# Patient Record
Sex: MALE | Race: OTHER | HISPANIC OR LATINO
[De-identification: names, ages, dates, MRNs, and addresses within clinical notes are randomized per-mention and may not be internally consistent; named-entity substitution may affect disease eponyms.]

---

## 2017-12-05 PROBLEM — Z00.00 ENCOUNTER FOR PREVENTIVE HEALTH EXAMINATION: Status: ACTIVE | Noted: 2017-12-05

## 2017-12-06 ENCOUNTER — APPOINTMENT (OUTPATIENT)
Dept: SPINE | Facility: CLINIC | Age: 37
End: 2017-12-06
Payer: COMMERCIAL

## 2017-12-06 VITALS
SYSTOLIC BLOOD PRESSURE: 127 MMHG | BODY MASS INDEX: 32.2 KG/M2 | HEART RATE: 78 BPM | HEIGHT: 71 IN | DIASTOLIC BLOOD PRESSURE: 78 MMHG | WEIGHT: 230 LBS | OXYGEN SATURATION: 99 %

## 2017-12-06 DIAGNOSIS — Z78.9 OTHER SPECIFIED HEALTH STATUS: ICD-10-CM

## 2017-12-06 DIAGNOSIS — M51.26 OTHER INTERVERTEBRAL DISC DISPLACEMENT, LUMBAR REGION: ICD-10-CM

## 2017-12-06 DIAGNOSIS — R42 DIZZINESS AND GIDDINESS: ICD-10-CM

## 2017-12-06 DIAGNOSIS — M54.16 RADICULOPATHY, LUMBAR REGION: ICD-10-CM

## 2017-12-06 PROCEDURE — 99204 OFFICE O/P NEW MOD 45 MIN: CPT

## 2017-12-07 PROBLEM — R42 LIGHTHEADEDNESS: Status: RESOLVED | Noted: 2017-12-06 | Resolved: 2017-12-07

## 2017-12-07 PROBLEM — M51.26 LUMBAR HERNIATED DISC: Status: ACTIVE | Noted: 2017-12-06

## 2017-12-07 PROBLEM — M54.16 LUMBAR RADICULOPATHY: Status: ACTIVE | Noted: 2017-12-06

## 2017-12-07 PROBLEM — Z78.9 NEVER SMOKED CIGARETTES: Status: ACTIVE | Noted: 2017-12-06

## 2017-12-07 PROBLEM — Z78.9 DOES NOT USE ILLICIT DRUGS: Status: ACTIVE | Noted: 2017-12-06

## 2019-03-26 ENCOUNTER — FORM ENCOUNTER (OUTPATIENT)
Age: 39
End: 2019-03-26

## 2019-03-27 ENCOUNTER — OUTPATIENT (OUTPATIENT)
Dept: OUTPATIENT SERVICES | Facility: HOSPITAL | Age: 39
LOS: 1 days | End: 2019-03-27
Payer: COMMERCIAL

## 2019-03-27 ENCOUNTER — APPOINTMENT (OUTPATIENT)
Dept: ORTHOPEDIC SURGERY | Facility: CLINIC | Age: 39
End: 2019-03-27
Payer: COMMERCIAL

## 2019-03-27 VITALS
BODY MASS INDEX: 32.2 KG/M2 | OXYGEN SATURATION: 98 % | HEIGHT: 71 IN | SYSTOLIC BLOOD PRESSURE: 115 MMHG | DIASTOLIC BLOOD PRESSURE: 80 MMHG | HEART RATE: 79 BPM | WEIGHT: 230 LBS

## 2019-03-27 DIAGNOSIS — M54.5 LOW BACK PAIN: ICD-10-CM

## 2019-03-27 DIAGNOSIS — Z78.9 OTHER SPECIFIED HEALTH STATUS: ICD-10-CM

## 2019-03-27 DIAGNOSIS — R20.2 PARESTHESIA OF SKIN: ICD-10-CM

## 2019-03-27 DIAGNOSIS — M51.36 OTHER INTERVERTEBRAL DISC DEGENERATION, LUMBAR REGION: ICD-10-CM

## 2019-03-27 DIAGNOSIS — G89.29 LOW BACK PAIN: ICD-10-CM

## 2019-03-27 DIAGNOSIS — M99.83 OTHER BIOMECHANICAL LESIONS OF LUMBAR REGION: ICD-10-CM

## 2019-03-27 PROCEDURE — 72082 X-RAY EXAM ENTIRE SPI 2/3 VW: CPT

## 2019-03-27 PROCEDURE — 99204 OFFICE O/P NEW MOD 45 MIN: CPT

## 2019-03-27 PROCEDURE — 72100 X-RAY EXAM L-S SPINE 2/3 VWS: CPT

## 2019-03-27 PROCEDURE — 72084 X-RAY EXAM ENTIRE SPI 6/> VW: CPT | Mod: 26

## 2019-03-27 RX ORDER — CYCLOBENZAPRINE HYDROCHLORIDE 10 MG/1
10 TABLET, FILM COATED ORAL
Refills: 0 | Status: ACTIVE | COMMUNITY

## 2019-03-27 RX ORDER — METHYLPREDNISOLONE 4 MG/1
4 TABLET ORAL
Qty: 1 | Refills: 0 | Status: DISCONTINUED | COMMUNITY
Start: 2017-12-07 | End: 2019-03-27

## 2019-03-27 RX ORDER — NAPROXEN 500 MG/1
500 TABLET ORAL
Refills: 0 | Status: ACTIVE | COMMUNITY

## 2019-03-27 NOTE — PHYSICAL EXAM
[de-identified] : General: patient is well developed, well nourished, in no acute \par distress, alert and oriented x 3. \par \par Mood and affect: normal\par \par Respiratory: no respiratory distress noted\par \par Skin: no scars over spine, skin intact, no erythema, increased warmth\par \par Alignment:The spine is well compensated in the coronal and sagittal plane.  There is no asymmetry on the carr forward bend test\par \par Gait: The patient is able to toe walk and heel walk without difficulty. The patient is able to tandem gait without difficulty.\par \par Palpation: no tenderness to palpation spine or paraspinal region\par \par Range of motion: Lumbar spine ROM is full\par \par Neurologic Exam:\par Motor: Manual Muscle testing in the lower extremities is 5 out of 5 in all muscle groups. There is no evidence of muscular atrophy in the lower extremities. Sensory: Sensation to light touch is grossly intact in the lower extremities\par \par Reflexes: DTR are present and symmetric throughout, negative barry bilaterally, negative inverted radial reflex bilaterally, no clonus, plantar responses are flexor\par \par Hip Exam: No pain with internal or external rotation of hips bilaterally\par \par Special tests: Straight leg raise test negative.  Cross straight leg test negative.  KYLAH test negative\par \par Vascular: Examination of the peripheral vascular system demonstrates no evidence of congestion or edema. no evidence of lymphedema bilateral lower extremities, pulses are present and symmetric in both lower extremities.\par \par  [de-identified] : XR thoracolumbar 3/27/19: Severe L5/S1 disc degeneration, height loss.  no instability/fractures.  no scoliosis.\par \par MRI lumbar 2017: L5/S1 severe disc degeneration with modic changes, height loss, central bulge.  right sided moderate foraminal stenosis, left side severe foraminal stenosis at L5/S1.  Remainder of discs normal without neurocompressive lesions at other levels.

## 2019-03-27 NOTE — REASON FOR VISIT
[Initial Consultation] : an initial consultation for [Back Pain] : back pain [FreeTextEntry2] : paresthesias in both legs

## 2019-03-27 NOTE — HISTORY OF PRESENT ILLNESS
[de-identified] : Mr. NINO is a very pleasant 38 year old male who complains of low back pain for years, worse over the past 3 years. On initial presentation symptoms were as follows: Patient described the pain as constant.  Patient rated the pain as 6/10 today, average 10/10 this week, 10/10 at its worst.  The pain localized to lower back.  There is no radiation of pain.  Patient reports numbness and paresthesias down both legs in nonspecific distribution to his feet. The pain is relieved by nothing in particular.  The pain is worsened by activity. Patient currently taking naproxen and flexeril with mild temporary relief. The patient has had physical therapy with mild improvement. He has not had steroid injections.\par \par The patient reports no loss of hand dexterity.\par The patient states there no loss of balance when walking.\par There no sensory loss in the arms or legs\par The patent no difficulty with urination.\par \par The patient no history of previous spine surgery.\par The patient no previous spine consultation.\par \par The patient has no history of unexpected weight loss, no history of active cancer, no history bladder or bowel dysfunction, no night pain, no fevers or chills.\par \par The past medical history, surgical history, family history, allergies, medications, 10+ point review of systems, family history and social history were reviewed and non contributory.\par \par

## 2019-03-27 NOTE — DISCUSSION/SUMMARY
[de-identified] : Mr. Pereira has chronic lower back pain, which has progressed over the past 15 years.  in the past 12 months he has failed course of PT and course of Rx NSAIDs.  He is becoming progressively disabled with work due to these symtpoms and he is no longer able to exercise as a result.  He is having trouble with ADLs due to the pain.  He is having parasthesias in his bilateral lower extremities.  MRI from 2017 shows severe L5/S1 disc degeneration with moderate to severe foraminal stenosis.  XRays today show severe L5/S1 disc degeneration/height loss.  I have recommended a new MRI lumbar, non contrast.  He will follow up after this is done.  All questions answered.

## 2019-04-17 ENCOUNTER — APPOINTMENT (OUTPATIENT)
Dept: ORTHOPEDIC SURGERY | Facility: CLINIC | Age: 39
End: 2019-04-17

## 2020-02-27 ENCOUNTER — HOSPITAL ENCOUNTER (EMERGENCY)
Dept: HOSPITAL 74 - JER | Age: 40
LOS: 1 days | Discharge: HOME | End: 2020-02-28
Payer: COMMERCIAL

## 2020-02-27 VITALS — BODY MASS INDEX: 33.5 KG/M2

## 2020-02-27 DIAGNOSIS — J10.1: Primary | ICD-10-CM

## 2020-02-27 PROCEDURE — 3E0337Z INTRODUCTION OF ELECTROLYTIC AND WATER BALANCE SUBSTANCE INTO PERIPHERAL VEIN, PERCUTANEOUS APPROACH: ICD-10-PCS

## 2020-02-27 PROCEDURE — 3E033NZ INTRODUCTION OF ANALGESICS, HYPNOTICS, SEDATIVES INTO PERIPHERAL VEIN, PERCUTANEOUS APPROACH: ICD-10-PCS

## 2020-02-27 PROCEDURE — 3E033GC INTRODUCTION OF OTHER THERAPEUTIC SUBSTANCE INTO PERIPHERAL VEIN, PERCUTANEOUS APPROACH: ICD-10-PCS

## 2020-02-27 NOTE — PDOC
Attending Attestation





- Resident


Resident Name: Denisa Neville





- ED Attending Attestation


I have performed the following: I have examined & evaluated the patient, The 

case was reviewed & discussed with the resident, I agree w/resident's findings 

& plan, Exceptions are as noted





- HPI


HPI: 





02/27/20 23:36


See resident HPI





- Physicial Exam


PE: 





02/27/20 23:37


Agree with documented exam





- Medical Decision Making





02/27/20 23:37


39M with documented Flu B here with n/v after taking 3 doses of tamiflu today





symptomatic tx


f/u labs





dispo per clinical course

## 2020-02-27 NOTE — PDOC
History of Present Illness





- General


Chief Complaint: Cold Symptoms


Stated Complaint: FLU LIKE SYMPTOMS


Time Seen by Provider: 02/27/20 19:28





- History of Present Illness


Initial Comments: 


02/27/20 19:43


41 yo M PMH glioma s/p resection 5 years ago (repeat MRI every year, last MRI 

one year ago), presenting with flu. Symptoms began on Sunday, diagnosed with 

influenza B yesterday by PCP and prescribed Tamiflu. Took 3 doses of Tamiflu 

today and developed nausea with vomiting and mild 5/10 epigastric abdominal 

pain. Further complains of cough. Tried acetaminophen and fluids at home without

relief.





Denies recent travel, CP, SOB, HA.








Past History





- Past Medical History


Allergies/Adverse Reactions: 


                                    Allergies











Allergy/AdvReac Type Severity Reaction Status Date / Time


 


No Known Allergies Allergy   Verified 02/27/20 19:24











Home Medications: 


Ambulatory Orders





Oseltamivir Phosphate 75 mg PO BID 02/27/20 











**Review of Systems





- Review of Systems


Comments:: 


02/27/20 19:47


GENERAL/CONSTITUTIONAL: endorses fever, chills, generalized weakness. Denies 

malaise, loss of appetite, weight change


HEAD, EYES, EARS, NOSE AND THROAT: denies rhinorrhea, nasal congestion, throat 

pain, throat swelling, difficulty swallowing, mouth swelling, ear pain, eye 

pain, visual changes


NEUROLOGIC: endorses dizziness. Denies headache, focal weakness or paresthesias,

unsteady gait, seizure, mental status changes, bladder or bowel incontinence


CARDIOVASCULAR: denies chest pain, syncope, palpitations, irregular heart rate, 

lightheadedness, peripheral edema


RESPIRATORY: endorses cough. Denies shortness of breath, dyspnea with exertion, 

orthopnea, wheezing, stridor, hemoptysis


GASTROINTESTINAL: endorses epigastric abdominal pain, nausea and vomiting. 

Denies abdominal distension, diarrhea, constipation, melena, hematochezia


GENITOURINARY: denies dysuria, frequency, urgency, hesitancy, hematuria, flank 

pain, genital pain


MUSCULOSKELETAL: endorses myalgias. Denies arthralgia, joint swelling, back 

pain, neck pain


SKIN: denies rash, itching, pallor


HEMATOLOGIC/IMMUNOLOGIC: denies easy bleeding, easy bruising, lymphadenopathy, 

frequent infections


ENDOCRINE: denies unexplained weight gain, unexplained weight loss, heat 

intolerance, cold intolerance


PSYCHIATRIC: denies anxiety, depression, suicidal or homicidal ideation, 

hallucinations








*Physical Exam





- Physical Exam


02/27/20 19:51


Gen: well-developed, well-nourished, appears uncomfortable


Neuro: AAOX4, CN II-XII intact, FTN intact, EOMI, PERRLA, 5/5 strength, SILT


HEENT: atraumatic, normocephalic, dry mucous membranes


Neck: trachea midline, supple


CV: tachycardic, regular rhythm, no murmurs, rubs, or gallops


Pulm: CTA b/l, no wheezing


Abd: soft, non-distended, epigastric tenderness


MSK: full ROM, intact pulses                                                    

                                                                                

                                                                                

                                                                                

                                                                                

                                                                                

                                                     


Extr: no edema, no deformities


Skin: warm, dry








ED Treatment Course





- LABORATORY


CBC & Chemistry Diagram: 


                                 02/28/20 00:25





                                 02/28/20 00:25





Medical Decision Making





- Medical Decision Making


02/27/20 19:51


Patient diagnosed with flu yesterday. N/V likely secondary to multiple doses of 

Tamiflu.


- IVF


- Zofran


- Ofirmev


- reassess





02/27/20 20:35


Considering epigastric tenderness (likely from emesis), will give Pepcid and 

Maalox.





02/27/20 22:30


Patient complaining of unchanged pain. Will get CBC, CMP, lipase.





02/28/20 01:22


Labs unremarkable. Patient tolerating PO. Will dc for further outpatient 

management.








Discharge





- Discharge Information


Problems reviewed: Yes


Clinical Impression/Diagnosis: 


 Flu





Condition: Improved


Disposition: HOME





- Admission


No





- Follow up/Referral


Referrals: 


ON STAFF,NOT [Primary Care Provider] - 





- Patient Discharge Instructions


Additional Instructions: 


You were seen with the flu. This improved with medications and fluid. Please con

tinue to drink plenty of fluids and take ibuprofen/acetaminophen as needed for 

pain. Follow up with your primary care doctor within one week. Return to the ED 

if you develop worsening symptoms.





- Post Discharge Activity

## 2020-02-28 VITALS — SYSTOLIC BLOOD PRESSURE: 133 MMHG | HEART RATE: 83 BPM | DIASTOLIC BLOOD PRESSURE: 68 MMHG | TEMPERATURE: 100.7 F

## 2020-02-28 LAB
ALBUMIN SERPL-MCNC: 3.4 G/DL (ref 3.4–5)
ALP SERPL-CCNC: 55 U/L (ref 45–117)
ALT SERPL-CCNC: 42 U/L (ref 13–61)
ANION GAP SERPL CALC-SCNC: 4 MMOL/L (ref 8–16)
AST SERPL-CCNC: 24 U/L (ref 15–37)
BASOPHILS # BLD: 0.2 % (ref 0–2)
BILIRUB SERPL-MCNC: 0.3 MG/DL (ref 0.2–1)
BUN SERPL-MCNC: 9.3 MG/DL (ref 7–18)
CALCIUM SERPL-MCNC: 7.5 MG/DL (ref 8.5–10.1)
CHLORIDE SERPL-SCNC: 108 MMOL/L (ref 98–107)
CO2 SERPL-SCNC: 28 MMOL/L (ref 21–32)
CREAT SERPL-MCNC: 0.9 MG/DL (ref 0.55–1.3)
DEPRECATED RDW RBC AUTO: 12.2 % (ref 11.9–15.9)
EOSINOPHIL # BLD: 0 % (ref 0–4.5)
GLUCOSE SERPL-MCNC: 101 MG/DL (ref 74–106)
HCT VFR BLD CALC: 38.3 % (ref 35.4–49)
HGB BLD-MCNC: 12.9 GM/DL (ref 11.7–16.9)
LIPASE SERPL-CCNC: 102 U/L (ref 73–393)
LYMPHOCYTES # BLD: 23.9 % (ref 8–40)
MCH RBC QN AUTO: 28.2 PG (ref 25.7–33.7)
MCHC RBC AUTO-ENTMCNC: 33.6 G/DL (ref 32–35.9)
MCV RBC: 83.8 FL (ref 80–96)
MONOCYTES # BLD AUTO: 6.5 % (ref 3.8–10.2)
NEUTROPHILS # BLD: 69.4 % (ref 42.8–82.8)
PLATELET # BLD AUTO: 207 K/MM3 (ref 134–434)
PMV BLD: 7.8 FL (ref 7.5–11.1)
POTASSIUM SERPLBLD-SCNC: 4.2 MMOL/L (ref 3.5–5.1)
PROT SERPL-MCNC: 6.3 G/DL (ref 6.4–8.2)
RBC # BLD AUTO: 4.56 M/MM3 (ref 4–5.6)
SODIUM SERPL-SCNC: 141 MMOL/L (ref 136–145)
WBC # BLD AUTO: 5.1 K/MM3 (ref 4–10)

## 2020-04-19 ENCOUNTER — TRANSCRIPTION ENCOUNTER (OUTPATIENT)
Age: 40
End: 2020-04-19

## 2023-02-22 ENCOUNTER — NON-APPOINTMENT (OUTPATIENT)
Age: 43
End: 2023-02-22

## 2023-10-26 ENCOUNTER — NON-APPOINTMENT (OUTPATIENT)
Age: 43
End: 2023-10-26

## 2023-11-02 ENCOUNTER — NON-APPOINTMENT (OUTPATIENT)
Age: 43
End: 2023-11-02